# Patient Record
Sex: FEMALE
[De-identification: names, ages, dates, MRNs, and addresses within clinical notes are randomized per-mention and may not be internally consistent; named-entity substitution may affect disease eponyms.]

---

## 2017-03-24 ENCOUNTER — HOSPITAL ENCOUNTER (EMERGENCY)
Dept: HOSPITAL 31 - C.ER | Age: 82
Discharge: HOME | End: 2017-03-24
Payer: COMMERCIAL

## 2017-03-24 VITALS
TEMPERATURE: 97.7 F | HEART RATE: 79 BPM | OXYGEN SATURATION: 97 % | DIASTOLIC BLOOD PRESSURE: 64 MMHG | SYSTOLIC BLOOD PRESSURE: 126 MMHG

## 2017-03-24 VITALS — BODY MASS INDEX: 20.4 KG/M2

## 2017-03-24 VITALS — RESPIRATION RATE: 20 BRPM

## 2017-03-24 DIAGNOSIS — S80.01XA: Primary | ICD-10-CM
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
## 2017-03-24 DIAGNOSIS — R60.0: ICD-10-CM

## 2017-03-24 LAB
ALBUMIN/GLOB SERPL: 1.2 {RATIO} (ref 1–2.1)
ALP SERPL-CCNC: 94 U/L (ref 38–126)
ALT SERPL-CCNC: 32 U/L (ref 9–52)
AST SERPL-CCNC: 43 U/L (ref 14–36)
BACTERIA #/AREA URNS HPF: (no result) /[HPF]
BASOPHILS # BLD AUTO: 0 K/UL (ref 0–0.2)
BASOPHILS NFR BLD: 0.4 % (ref 0–2)
BILIRUB SERPL-MCNC: 1 MG/DL (ref 0.2–1.3)
BILIRUB UR-MCNC: NEGATIVE MG/DL
BUN SERPL-MCNC: 24 MG/DL (ref 7–17)
CALCIUM SERPL-MCNC: 10 MG/DL (ref 8.6–10.4)
CHLORIDE SERPL-SCNC: 97 MMOL/L (ref 98–107)
CO2 SERPL-SCNC: 31 MMOL/L (ref 22–30)
EOSINOPHIL # BLD AUTO: 0.1 K/UL (ref 0–0.7)
EOSINOPHIL NFR BLD: 0.8 % (ref 0–4)
ERYTHROCYTE [DISTWIDTH] IN BLOOD BY AUTOMATED COUNT: 14.5 % (ref 11.5–14.5)
GLOBULIN SER-MCNC: 3.9 GM/DL (ref 2.2–3.9)
GLUCOSE SERPL-MCNC: 98 MG/DL (ref 65–105)
GLUCOSE UR STRIP-MCNC: NORMAL MG/DL
HCT VFR BLD CALC: 42.1 % (ref 34–47)
KETONES UR STRIP-MCNC: NEGATIVE MG/DL
LEUKOCYTE ESTERASE UR-ACNC: (no result) LEU/UL
LYMPHOCYTES # BLD AUTO: 2.2 K/UL (ref 1–4.3)
LYMPHOCYTES NFR BLD AUTO: 24.2 % (ref 20–40)
MCH RBC QN AUTO: 28.7 PG (ref 27–31)
MCHC RBC AUTO-ENTMCNC: 33.9 G/DL (ref 33–37)
MCV RBC AUTO: 84.7 FL (ref 81–99)
MONOCYTES # BLD: 0.7 K/UL (ref 0–0.8)
MONOCYTES NFR BLD: 7.3 % (ref 0–10)
NRBC BLD AUTO-RTO: 0 % (ref 0–2)
PH UR STRIP: 7 [PH] (ref 5–8)
PLATELET # BLD: 299 K/UL (ref 130–400)
PMV BLD AUTO: 8.7 FL (ref 7.2–11.7)
POTASSIUM SERPL-SCNC: 3.5 MMOL/L (ref 3.6–5.2)
PROT SERPL-MCNC: 8.5 G/DL (ref 6.3–8.3)
PROT UR STRIP-MCNC: (no result) MG/DL
RBC # UR STRIP: NEGATIVE /UL
RBC #/AREA URNS HPF: 1 /HPF (ref 0–3)
SODIUM SERPL-SCNC: 143 MMOL/L (ref 132–148)
SP GR UR STRIP: 1.01 (ref 1–1.03)
UROBILINOGEN UR-MCNC: NORMAL MG/DL (ref 0.2–1)
WBC # BLD AUTO: 9.2 K/UL (ref 4.8–10.8)
WBC #/AREA URNS HPF: 12 /HPF (ref 0–5)

## 2017-03-24 PROCEDURE — 85025 COMPLETE CBC W/AUTO DIFF WBC: CPT

## 2017-03-24 PROCEDURE — 85378 FIBRIN DEGRADE SEMIQUANT: CPT

## 2017-03-24 PROCEDURE — 93971 EXTREMITY STUDY: CPT

## 2017-03-24 PROCEDURE — 99284 EMERGENCY DEPT VISIT MOD MDM: CPT

## 2017-03-24 PROCEDURE — 81001 URINALYSIS AUTO W/SCOPE: CPT

## 2017-03-24 PROCEDURE — 84484 ASSAY OF TROPONIN QUANT: CPT

## 2017-03-24 PROCEDURE — 96372 THER/PROPH/DIAG INJ SC/IM: CPT

## 2017-03-24 PROCEDURE — 83880 ASSAY OF NATRIURETIC PEPTIDE: CPT

## 2017-03-24 PROCEDURE — 80053 COMPREHEN METABOLIC PANEL: CPT

## 2017-03-24 NOTE — C.PDOC
History Of Present Illness


85 year old patient, with a past medical history of hypertension, hyperlipidemia

, anemia, and Alzheimer's Disease, presents to the ED after Dr. Quiles's 

referral. Patient states she has a right lower leg hematoma and swelling. Her 

PMD wants to rule out DVT and sent the patient here. Patient denies numbness, 

weakness, fever, or any other complaints at this time. 


Time Seen by Provider: 17 13:42


Chief Complaint (Nursing): Lower Extremity Problem/Injury


History Per: Patient


History/Exam Limitations: no limitations


Onset/Duration Of Symptoms: Other


Severity: Mild


Pain Scale Rating Of: 3


Recent travel outside of the [place]: No





Past Medical History


Reviewed: Historical Data, Nursing Documentation, Vital Signs


Vital Signs: 


 Last Vital Signs











Temp  97.7 F   17 13:09


 


Pulse  79   17 13:09


 


Resp  20   17 17:01


 


BP  126/64   17 13:09


 


Pulse Ox  97   17 18:36














- Medical History


PMH: Alzheimer's Disease, Anemia, HTN, Hyperlipidemia


Family History: States: Unknown Family Hx





- Social History


Hx Alcohol Use: No


Hx Substance Use: No





- Immunization History


Hx Tetanus Toxoid Vaccination: No


Hx Influenza Vaccination: Yes


Hx Pneumococcal Vaccination: No





Review Of Systems


Except As Marked, All Systems Reviewed And Found Negative.


Constitutional: Negative for: Fever


Cardiovascular: Positive for: Other (right lower leg swelling and hematoma)


Musculoskeletal: Positive for: Leg Pain (right lower)


Neurological: Negative for: Weakness, Numbness





Physical Exam





- Physical Exam


Appears: Non-toxic, No Acute Distress


Skin: Warm, Dry, Other (2 out of 4 edema right knee down to foot; 5 x 5 cm 

hematoma behind the right knee to the posterior medial aspect of the right knee)


Head: Atraumatic, Normacephalic


Neck: Normal ROM, Supple


Chest: Symmetrical


Cardiovascular: Rhythm Regular


Respiratory: Normal Breath Sounds, No Rales, No Rhonchi, No Wheezing


Gastrointestinal/Abdominal: Soft, No Tenderness


Back: Normal Inspection, No CVA Tenderness


Extremity: Normal ROM, No Calf Tenderness, No Deformity


Neurological/Psych: Oriented x3





ED Course And Treatment





- Laboratory Results


Result Diagrams: 


 17 14:45





 17 14:45


Lab Interpretation: Abnormal (d-dimer 352 H)


ECG: Interpreted By Me


ECG Rhythm: Sinus Rhythm


ECG Interpretation: Normal


Rate From EC


O2 Sat by Pulse Oximetry: 97 (RA)


Pulse Ox Interpretation: Normal


Progress Note: EKG, Labs, Lovenox, VBG


Reevaluation Time: 16:46


Reassessment Condition: Unchanged





- Physician Consult Information


Outcome Of Conversation: 1640: d/w Dr. Quiles- ok to d/c and f/u in office on 

Wenesday 3/29. no acute interventions.





Medical Decision Making


Medical Decision Making: 


R leg hematoma posteriormedial R  knee area, and NO DVT by US





Disposition


Doctor Will See Patient In The: Office


Counseled Patient/Family Regarding: Studies Performed, Diagnosis





- Disposition


Referrals: 


Cheyenne Quiles MD [Medical Doctor] - 


Disposition: HOME/ ROUTINE


Disposition Time: 16:47


Condition: GOOD


Additional Instructions: 


mantiene elevado tima puede


Sigue con Dr. Quiles en lindo officina el [place] - llama para hacer tyron.


Instructions:  Leg Edema (ED), Hematoma (ED)


Print Language: Sammarinese





- Clinical Impression


Clinical Impression: 


 Leg edema, right, Hematoma





- Scribe Statement


The provider has reviewed the documentation as recorded by the Scribe


Delmis Webster


Provider Attestation: 





All medical record entries made by the Scribe were at my direction and 

personally dictated by me. I have reviewed the chart and agree that the record 

accurately reflects my personal performance of the history, physical exam, 

medical decision making, and the department course for this patient. I have 

also personally directed, reviewed, and agree with the discharge instructions 

and disposition.

## 2017-03-27 NOTE — VASCLAB
PROCEDURE:  Right Lower Extremity Venous Duplex Exam. 



HISTORY:

R leg edema x 2 wks 



PRIORS:

07/18/2016 



TECHNIQUE:

Right common femoral, femoral, popliteal and posterior tibial, 

peroneal and great saphenous veins were evaluated. Flow was assessed 

with color Doppler, compressibility, assessment of phasic flow and 

augmentation response.



Report prepared by   TALON Flores, RVT



FINDINGS:



RIGHT:

1. Common Femoral Vein: 



1.1. Compressibility - Fully compressible: Thrombus -  None: Flow - 

Phasic: Augmentation -Normal: Reflux - None.



2. Femoral Vein: 



2.1. Compressibility - Fully compressible: Thrombus -  None: Flow - 

Phasic: Augmentation -Normal: Reflux - None.



3. Popliteal Vein: 



3.1. Compressibility - Fully compressible: Thrombus -  None: Flow - 

Phasic: Augmentation -Normal: Reflux - None.



4. Posterior Tibial Vein: 



4.1. Compressibility - Fully compressible: Thrombus -  None: Flow - 

Phasic: Augmentation -Normal: Reflux - None.



5. Peroneal Vein: 



5.1. Compressibility - Fully compressible: Thrombus - None:  Flow - 

Phasic: Augmentation -Normal: Reflux - None.



6. Great Saphenous Vein:

6.1.  Compressibility - Fully compressible: Thrombus -None: Flow - 

Phasic: Augmentation - Normal: Reflux - None.





OTHER FINDINGS:  Right lower extremity edema.



IMPRESSION:

No evidence of deep or superficial vein thrombosis of the right lower 

extremity with excellent venous flow. Normal valve function noted of 

the right side.     



Normal venous flow noted in the left common femoral vein.

## 2019-02-22 ENCOUNTER — HOSPITAL ENCOUNTER (INPATIENT)
Dept: HOSPITAL 31 - C.ER | Age: 84
LOS: 4 days | Discharge: NURSING, REHAB OR LONG-TERM CARE | DRG: 66 | End: 2019-02-26
Attending: INTERNAL MEDICINE | Admitting: INTERNAL MEDICINE
Payer: COMMERCIAL

## 2019-02-22 VITALS — BODY MASS INDEX: 20.4 KG/M2

## 2019-02-22 DIAGNOSIS — Z86.73: ICD-10-CM

## 2019-02-22 DIAGNOSIS — E11.9: ICD-10-CM

## 2019-02-22 DIAGNOSIS — G30.9: ICD-10-CM

## 2019-02-22 DIAGNOSIS — D64.9: ICD-10-CM

## 2019-02-22 DIAGNOSIS — F02.80: ICD-10-CM

## 2019-02-22 DIAGNOSIS — R29.810: ICD-10-CM

## 2019-02-22 DIAGNOSIS — R47.01: ICD-10-CM

## 2019-02-22 DIAGNOSIS — I63.89: Primary | ICD-10-CM

## 2019-02-22 DIAGNOSIS — E78.5: ICD-10-CM

## 2019-02-22 DIAGNOSIS — I10: ICD-10-CM

## 2019-02-22 LAB
ALBUMIN SERPL-MCNC: 4.8 G/DL (ref 3.5–5)
ALBUMIN/GLOB SERPL: 1.5 {RATIO} (ref 1–2.1)
ALT SERPL-CCNC: 19 U/L (ref 9–52)
APTT BLD: 30 SECONDS (ref 21–34)
AST SERPL-CCNC: 34 U/L (ref 14–36)
BASOPHILS # BLD AUTO: 0.1 K/UL (ref 0–0.2)
BASOPHILS NFR BLD: 0.8 % (ref 0–2)
BUN SERPL-MCNC: 21 MG/DL (ref 7–17)
CALCIUM SERPL-MCNC: 9.8 MG/DL (ref 8.6–10.4)
EOSINOPHIL # BLD AUTO: 0 K/UL (ref 0–0.7)
EOSINOPHIL NFR BLD: 0.4 % (ref 0–4)
ERYTHROCYTE [DISTWIDTH] IN BLOOD BY AUTOMATED COUNT: 14.9 % (ref 11.5–14.5)
GFR NON-AFRICAN AMERICAN: > 60
HDLC SERPL-MCNC: 70 MG/DL (ref 30–70)
HGB BLD-MCNC: 13.4 G/DL (ref 11–16)
INR PPP: 1
LDLC SERPL-MCNC: 68 MG/DL (ref 0–129)
LYMPHOCYTES # BLD AUTO: 1.6 K/UL (ref 1–4.3)
LYMPHOCYTES NFR BLD AUTO: 17.3 % (ref 20–40)
MCH RBC QN AUTO: 28.6 PG (ref 27–31)
MCHC RBC AUTO-ENTMCNC: 33.7 G/DL (ref 33–37)
MCV RBC AUTO: 84.7 FL (ref 81–99)
MONOCYTES # BLD: 0.5 K/UL (ref 0–0.8)
MONOCYTES NFR BLD: 5.4 % (ref 0–10)
NEUTROPHILS # BLD: 6.8 K/UL (ref 1.8–7)
NEUTROPHILS NFR BLD AUTO: 76.1 % (ref 50–75)
NRBC BLD AUTO-RTO: 0.1 % (ref 0–2)
PLATELET # BLD: 278 K/UL (ref 130–400)
PMV BLD AUTO: 10.3 FL (ref 7.2–11.7)
PROTHROMBIN TIME: 10.9 SECONDS (ref 9.7–12.2)
RBC # BLD AUTO: 4.7 MIL/UL (ref 3.8–5.2)
WBC # BLD AUTO: 8.9 K/UL (ref 4.8–10.8)

## 2019-02-22 NOTE — C.PDOC
History Of Present Illness


87 year old female brought in from home by EMS for change in mental status. 

Patient usually has a home health aid, who noticed she was shaking, subjective 

fever, and unresponsive. At baseline patient is usually able to walk , talk ,and

cook for herself. She was last seen behaving normally 4 days ago. 





Time Seen by Provider: 02/22/19 17:43


Chief Complaint (Nursing): Altered Mental Status


History Per: EMS, Other (home health aid)


Onset/Duration Of Symptoms: Hrs


Current Symptoms Are (Timing): Still Present


Usual Baseline: Ambulatory, Other (Verbal )


Associated Symptoms: Fever, Other (tremors)





Past Medical History


Reviewed: Historical Data, Nursing Documentation, Vital Signs


Vital Signs: 





                                Last Vital Signs











Temp  96.6 F L  02/22/19 17:33


 


Pulse  93 H  02/22/19 18:51


 


Resp  20   02/22/19 18:51


 


BP  157/65 H  02/22/19 18:51


 


Pulse Ox  99   02/22/19 18:51














- Medical History


PMH: Alzheimer's Disease, Anemia, HTN, Hyperlipidemia


   Denies: Chronic Kidney Disease


Surgical History: No Surg Hx


Family History: States: Unknown Family Hx





- Social History


Hx Alcohol Use: No


Hx Substance Use: No





- Immunization History


Hx Tetanus Toxoid Vaccination: No


Hx Influenza Vaccination: Yes


Hx Pneumococcal Vaccination: No





Review Of Systems


Review Of Systems: ROS cannot be obtained secondary to pt's inabilty to answer 

questions. (patient unresponsive)





Physical Exam





- Physical Exam


Appears: Other (Awake, responsive only to painful stimuli)


Skin: Normal Color, Warm, Dry


Head: Atraumatic, Normacephalic


Neck: Normal ROM, Supple


Chest: Symmetrical, No Deformity


Cardiovascular: Rhythm Regular, No Murmur


Respiratory: No Accessory Muscle Use


Gastrointestinal/Abdominal: Soft, No Tenderness


Extremity: Capillary Refill (<2 seconds)


Extremity: Bilateral: Atraumatic, Normal Color And Temperature


Pulses: Left Radial: Normal, Right Radial: Normal


Neurological/Psych: Other (responsive only to painful stimuli)





ED Course And Treatment





- Laboratory Results


Result Diagrams: 


                                 02/22/19 18:04





                                 02/22/19 18:04


Lab Results: 





                                        











PT  10.9 SECONDS (9.7-12.2)   02/22/19  18:04    


 


INR  1.0   02/22/19  18:04    


 


APTT  30 SECONDS (21-34)   02/22/19  18:04    








                                        











Troponin I  < 0.0120 ng/mL (0.00-0.120)   02/22/19  18:04    








                                        











Total Bilirubin  1.2 mg/dL (0.2-1.3)   02/22/19  18:04    


 


AST  34 U/L (14-36)   02/22/19  18:04    


 


ALT  19 U/L (9-52)   02/22/19  18:04    


 


Alkaline Phosphatase  146 U/L ()  H  02/22/19  18:04    


 


Total Protein  8.1 g/dL (6.3-8.3)   02/22/19  18:04    


 


Albumin  4.8 g/dL (3.5-5.0)   02/22/19  18:04    


 


Globulin  3.2 gm/dL (2.2-3.9)   02/22/19  18:04    


 


Albumin/Globulin Ratio  1.5  (1.0-2.1)   02/22/19  18:04    











O2 Sat by Pulse Oximetry: 99 (RA)


Pulse Ox Interpretation: Normal





- Radiology


CXR: Interpreted by Me


CXR Interpretation: Yes: No Acute Disease





- CT Scan/US


  ** Head CT


Other Rad Studies (CT/US): Interpreted By Me, Read By Radiologist


CT/US Interpretation: atient Name / ID : AUGUSTO CHRISTINE M  / [id number].  Exam Date

: 02/22/2019 18:15:23 ( Approved ).  Study Comment :  Sex / Age : F  / 87Y.  

Creator : Ousmane Rodriguez MD.  Dictator : Ousmane Rodriguez MD.  

:  Approver : Ousmane Rodriguez MD.  Approver2 :  Report Date : 02/22/2019 

18:32:37.  My Comment :  ****************

*******************************************************************.  Date of 

service:  02/22/2019.  PROCEDURE:  CT HEAD WITHOUT CONTRAST.  HISTORY:  Mental 

status change.  COMPARISON:  No prior study available comparison.  TECHNIQUE:  

Axial computed tomography images were obtained through the head/brain without 

intravenous contrast.  Radiation dose:  Total exam DLP = 0.0 mGy-cm.  This CT 

exam was performed using one or more of the following dose reduction techniques:

Automated exposure control, adjustment of the mA and/or kV according to patient 

size, and/or use of iterative reconstruction technique.  FINDINGS:  HEMORRHAGE: 

No acute parenchymal, subarachnoid or extra-axial hemorrhage.  BRAIN:  There are

discrete chronic appearing left frontoparietal infarct changes with overlying ex

vacuo enlargement of the cerebral sulci. There is also on ex vacuo dilatation of

the left lateral ventricle... In addition, moderate diffuse and confluent 

chronic white matter ischemic changes seen extending peripherally into the deep 

and subcortical white matter both cerebral hemispheres. Few scattered chronic 

bilateral basal nuclei lacunar type infarcts also felt present.. Note that the 

possibility of a small hyperacute infarct not excluded.  Moderate to significant

atrophy slightly more central evidenced by disproportionate enlargement of the 

ventricles compared to sulci.  Vascular calcifications both carotid siphons.  No

obvious parenchymal nor extra-axial mass or collection seen on this noncontrast 

exam.  VENTRICLES:  The ventricles are dilated felt to be on ex vacuo basis 

particularly the left lateral ventricle more so than right and do not appear to 

be under tension.  CALVARIUM:  Unremarkable.  PARANASAL SINUSES:  Unremarkable 

as visualized. No significant inflammatory changes.  MASTOID AIR CELLS:  Note 

that in situ hearing aid partially obscures the temporal bones including the m

astoid air complexes. There is partial opacification of the right mastoid air 

complex and subtotal opacification left mastoid air complex. There is also 

partial opacification left middle ear canal..  OTHER FINDINGS:  The cataract 

surgery present.  IMPRESSION:  There are discrete chronic appearing left 

frontoparietal infarct changes with overlying ex vacuo enlargement of the 

cerebral sulci. There is also on ex vacuo dilatation of the left lateral 

ventricle... In addition, moderate diffuse and confluent chronic white matter 

ischemic changes seen extending peripherally into the deep and subcortical white

matter both cerebral hemispheres. Few scattered chronic bilateral basal nuclei 

lacunar type infarcts also felt present.. Note that the possibility of a small 

hyperacute infarct not excluded.  Moderate to significant atrophy slightly more 

central evidenced by disproportionate enlargement of the ventricles compared to 

sulci.


Progress Note: Head CT, EKG , and CXR ordered for patient. Labs and CBC ordered 

for patient.  Patient given Aspirin TX and IV fluids.  1900: spoke with Dr Bowen who requested consult with Dr. Fontenot.  1910: Dr. Fontenot agrees with course 

treatment. Recommends repeat CT scan in the morning. Recommmends it be ordered 

by another physician.  1900: pt re-eval. easily arousable, awake. persistent 

complete receptive and expressive aphasia c/w L temporal lobe stroke.  Gestures 

in coordinated fashion in response to verbal cues.


Reevaluation Time: 19:00


Reassessment Condition: Improved





Medical Decision Making


Medical Decision Making: 


Impression: left frontal parietal sub-acute stroke. resultant receptive and 

expressive aphasia, suggesting insult to Broca's and Wernicke's areas of the L 

temporal lobe, last seen baseline walking/talking/cooking approx 4 days ago.








Disposition


Doctor Will See Patient In The: Hospital


Counseled Patient/Family Regarding: Studies Performed, Diagnosis





- Disposition


Disposition: HOSPITALIZED


Disposition Time: 19:10


Condition: FAIR





- Clinical Impression


Clinical Impression: 


 Combined receptive and expressive aphasia, CVA (cerebral vascular accident)








- Scribe Statement


The provider has reviewed the documentation as recorded by the Scribe (Lamar Ordaz)








All medical record entries made by the Scribe were at my direction and 

personally dictated by me. I have reviewed the chart and agree that the record 

accurately reflects my personal performance of the history, physical exam, me

dical decision making, and the department course for this patient. I have also 

personally directed, reviewed, and agree with the discharge instructions and 

disposition.

## 2019-02-22 NOTE — C.PDOC
History Of Present Illness





biba from home, d


Time Seen by Provider: 02/22/19 17:43


Chief Complaint (Nursing): Altered Mental Status





Past Medical History


Vital Signs: 





                                Last Vital Signs











Temp  96.6 F L  02/22/19 17:33


 


Pulse  93 H  02/22/19 18:51


 


Resp  20   02/22/19 18:51


 


BP  157/65 H  02/22/19 18:51


 


Pulse Ox  99   02/22/19 18:51














- Medical History


PMH: Alzheimer's Disease, Anemia, HTN, Hyperlipidemia


   Denies: Chronic Kidney Disease


Family History: States: Unknown Family Hx





- Social History


Hx Alcohol Use: No


Hx Substance Use: No





- Immunization History


Hx Tetanus Toxoid Vaccination: No


Hx Influenza Vaccination: Yes


Hx Pneumococcal Vaccination: No





ED Course And Treatment





- Laboratory Results


Result Diagrams: 


                                 02/22/19 18:04





                                 02/22/19 18:04


Lab Results: 





                                        











PT  10.9 SECONDS (9.7-12.2)   02/22/19  18:04    


 


INR  1.0   02/22/19  18:04    


 


APTT  30 SECONDS (21-34)   02/22/19  18:04    








                                        











Troponin I  < 0.0120 ng/mL (0.00-0.120)   02/22/19  18:04    








                                        











Total Bilirubin  1.2 mg/dL (0.2-1.3)   02/22/19  18:04    


 


AST  34 U/L (14-36)   02/22/19  18:04    


 


ALT  19 U/L (9-52)   02/22/19  18:04    


 


Alkaline Phosphatase  146 U/L ()  H  02/22/19  18:04    


 


Total Protein  8.1 g/dL (6.3-8.3)   02/22/19  18:04    


 


Albumin  4.8 g/dL (3.5-5.0)   02/22/19  18:04    


 


Globulin  3.2 gm/dL (2.2-3.9)   02/22/19  18:04    


 


Albumin/Globulin Ratio  1.5  (1.0-2.1)   02/22/19  18:04    











O2 Sat by Pulse Oximetry: 99





NIHSS Stroke Scale 2





- Date/Time Evaluation Performed


Date Performed: 02/22/19


Time Performed: 17:45


When Was NIHSS Performed: Baseline





- How Severe is the Stroke


Level of Consciousness: 2=Obtunded


LOC to Questions: 2=Neither correct


LOC to commands: 0=Obeys both correctly


Best Gaze: 0=Normal


Visual: 0=No visual loss


Facial: 0=Normal


Motor Arm - Left: 0=No drift


Motor Arm - Right: 0=No drift


Motor Leg - Left: 0=No drift


Motor Leg - Right: 0=No drift


Limb Ataxia: 0=Absent


Sensory: 0=Normal


Best Language: 3=Mute


Dysarthia: 2=Severe, near unintelligible or worse


Extinction & Inattention (Neglect): 0=Normal, no object


Score: 9





rTPA Inclusion/Exclusion





- Refusal of Treatment


Patient Refused Treatment: No





- Inclusion Criteria for Altepase


Patient is 18 years or Older: Yes


Clinical DX Ischemic Stroke Cause Neurological Deficit: Yes


Time of Onset Established Less Than 270 Mins Before TX Begin: No


Risk/Benefit Discussed With Patient/Family Member Present: No





- Warning to TPA With Conditions


Condition: Age Greater Than 75 years





Disposition





- Disposition


Disposition: HOSPITALIZED

## 2019-02-23 LAB
BUN SERPL-MCNC: 13 MG/DL (ref 7–17)
CALCIUM SERPL-MCNC: 9.3 MG/DL (ref 8.6–10.4)
GFR NON-AFRICAN AMERICAN: > 60

## 2019-02-23 RX ADMIN — INSULIN ASPART SCH: 100 INJECTION, SOLUTION INTRAVENOUS; SUBCUTANEOUS at 21:38

## 2019-02-23 RX ADMIN — INSULIN ASPART SCH: 100 INJECTION, SOLUTION INTRAVENOUS; SUBCUTANEOUS at 11:48

## 2019-02-23 RX ADMIN — INSULIN ASPART SCH: 100 INJECTION, SOLUTION INTRAVENOUS; SUBCUTANEOUS at 08:00

## 2019-02-23 RX ADMIN — INSULIN ASPART SCH: 100 INJECTION, SOLUTION INTRAVENOUS; SUBCUTANEOUS at 17:23

## 2019-02-23 NOTE — RAD
Date of service: 



02/22/2019



HISTORY:

 Code Stroke 



COMPARISON:

10/19/2016 



FINDINGS:



LUNGS:

No active pulmonary disease.



PLEURA:

No significant pleural effusion identified, no pneumothorax apparent.



CARDIOVASCULAR:

No aortic atherosclerotic calcification present.



Normal cardiac size. No pulmonary vascular congestion. 



OSSEOUS STRUCTURES:

No significant abnormalities.



VISUALIZED UPPER ABDOMEN:

Normal.



OTHER FINDINGS:

None.



IMPRESSION:

No active disease.

## 2019-02-23 NOTE — CP.PCM.HP
History of Present Illness





- History of Present Illness


History of Present Illness: 





87 year old female brought in from home by EMS for change in mental status. 

Patient usually has a home health aid, who noticed she was shaking, subjective 

fever, and unresponsive. At baseline patient is usually able to walk , talk ,and

cook for herself. She was last seen behaving normally 4 days ago. 





Present on Admission





- Present on Admission


Any Indicators Present on Admission: No





Review of Systems





- Review of Systems


All systems: reviewed and no additional remarkable complaints except (As menti

oned in HPI)





Past Patient History





- Infectious Disease


Hx of Infectious Diseases: None





- Past Medical History & Family History


Past Medical History?: Yes





- Past Social History


Smoking Status: Never Smoked





- CARDIAC


Hx Hypertension: Yes





- PULMONARY


Hx Respiratory Disorders: No





- NEUROLOGICAL


Hx Alzheimer's Disease: Yes





- HEENT


Hx HEENT Problems: No





- RENAL


Hx Chronic Kidney Disease: No





- ENDOCRINE/METABOLIC


Hx Endocrine Disorders: Yes


Hx Diabetes Mellitus Type 2: Yes





- HEMATOLOGICAL/ONCOLOGICAL


Hx Anemia: Yes





- INTEGUMENTARY


Hx Dermatological Problems: No





- MUSCULOSKELETAL/RHEUMATOLOGICAL


Hx Musculoskeletal Disorders: No


Hx Falls: No





- GASTROINTESTINAL


Hx Gastrointestinal Disorders: No





- GENITOURINARY/GYNECOLOGICAL


Hx Genitourinary Disorders: No





- PSYCHIATRIC


Hx Substance Use: No





- SURGICAL HISTORY


Hx Surgeries: Yes


Hx Mastectomy: Yes (right breast)





- ANESTHESIA


Hx Anesthesia: Yes


Hx Anesthesia Reactions: No


Hx Malignant Hyperthermia: No





Meds


Home Medications: 


                              Home Medication List











 Medication  Instructions  Recorded  Confirmed  Type


 


Insulin Aspart, Recombinant 0 unit SC ACHS  unit 02/26/19  Rx





[Novolog]    


 


Rosuvastatin Calcium [Crestor] 5 mg PO HS  tab 02/26/19  Rx


 


amLODIPine [Norvasc] 10 mg PO DAILY  tab 02/26/19  Rx











Allergies/Adverse Reactions: 


                                    Allergies











Allergy/AdvReac Type Severity Reaction Status Date / Time


 


No Known Allergies Allergy   Verified 02/22/19 17:38














Physical Exam





- Head Exam


Head Exam: NORMAL INSPECTION





- Eye Exam


Eye Exam: Normal appearance





- ENT Exam


ENT Exam: Mucous Membranes Moist





- Respiratory Exam


Respiratory Exam: Clear to Auscultation Bilateral





- Cardiovascular Exam


Cardiovascular Exam: REGULAR RHYTHM, +S1, +S2





- GI/Abdominal Exam


GI & Abdominal Exam: Normal Bowel Sounds





- Extremities Exam


Extremities exam: Positive for: normal inspection





Results





- Vital Signs


Recent Vital Signs: 





                                Last Vital Signs











Temp  98.5 F   02/23/19 07:00


 


Pulse  57 L  02/23/19 07:30


 


Resp  20   02/23/19 07:00


 


BP  131/51 L  02/23/19 07:00


 


Pulse Ox  96   02/23/19 07:00














- Labs


Result Diagrams: 


                                 02/22/19 18:04





                                 02/24/19 08:06


Labs: 





                         Laboratory Results - last 24 hr











  02/22/19 02/22/19 02/22/19





  17:31 18:04 18:04


 


WBC   8.9 


 


RBC   4.70 


 


Hgb   13.4 


 


Hct   39.8 


 


MCV   84.7 


 


MCH   28.6 


 


MCHC   33.7 


 


RDW   14.9 H 


 


Plt Count   278 


 


MPV   10.3 


 


Neut % (Auto)   76.1 H 


 


Lymph % (Auto)   17.3 L 


 


Mono % (Auto)   5.4 


 


Eos % (Auto)   0.4 


 


Baso % (Auto)   0.8 


 


Neut # (Auto)   6.8 


 


Lymph # (Auto)   1.6 


 


Mono # (Auto)   0.5 


 


Eos # (Auto)   0.0 


 


Baso # (Auto)   0.1 


 


PT    10.9


 


INR    1.0


 


APTT    30


 


Sodium   


 


Potassium   


 


Chloride   


 


Carbon Dioxide   


 


Anion Gap   


 


BUN   


 


Creatinine   


 


Est GFR ( Amer)   


 


Est GFR (Non-Af Amer)   


 


POC Glucose (mg/dL)  156 H  


 


Random Glucose   


 


Hemoglobin A1c   


 


Calcium   


 


Total Bilirubin   


 


AST   


 


ALT   


 


Alkaline Phosphatase   


 


Troponin I   


 


Total Protein   


 


Albumin   


 


Globulin   


 


Albumin/Globulin Ratio   


 


Triglycerides   


 


Cholesterol   


 


LDL Cholesterol Direct   


 


HDL Cholesterol   














  02/22/19 02/22/19 02/22/19





  18:04 18:04 20:57


 


WBC   


 


RBC   


 


Hgb   


 


Hct   


 


MCV   


 


MCH   


 


MCHC   


 


RDW   


 


Plt Count   


 


MPV   


 


Neut % (Auto)   


 


Lymph % (Auto)   


 


Mono % (Auto)   


 


Eos % (Auto)   


 


Baso % (Auto)   


 


Neut # (Auto)   


 


Lymph # (Auto)   


 


Mono # (Auto)   


 


Eos # (Auto)   


 


Baso # (Auto)   


 


PT   


 


INR   


 


APTT   


 


Sodium  142  


 


Potassium  3.2 L  


 


Chloride  104  


 


Carbon Dioxide  25  


 


Anion Gap  17  


 


BUN  21 H  


 


Creatinine  0.7  


 


Est GFR ( Amer)  > 60  


 


Est GFR (Non-Af Amer)  > 60  


 


POC Glucose (mg/dL)    118 H


 


Random Glucose  154 H D  


 


Hemoglobin A1c   5.8 


 


Calcium  9.8  


 


Total Bilirubin  1.2  


 


AST  34  


 


ALT  19  


 


Alkaline Phosphatase  146 H  


 


Troponin I  < 0.0120  


 


Total Protein  8.1  


 


Albumin  4.8  


 


Globulin  3.2  


 


Albumin/Globulin Ratio  1.5  


 


Triglycerides  104  


 


Cholesterol  144  


 


LDL Cholesterol Direct  68  


 


HDL Cholesterol  70  














  02/23/19 02/23/19





  06:24 11:20


 


WBC  


 


RBC  


 


Hgb  


 


Hct  


 


MCV  


 


MCH  


 


MCHC  


 


RDW  


 


Plt Count  


 


MPV  


 


Neut % (Auto)  


 


Lymph % (Auto)  


 


Mono % (Auto)  


 


Eos % (Auto)  


 


Baso % (Auto)  


 


Neut # (Auto)  


 


Lymph # (Auto)  


 


Mono # (Auto)  


 


Eos # (Auto)  


 


Baso # (Auto)  


 


PT  


 


INR  


 


APTT  


 


Sodium   144


 


Potassium   3.0 L


 


Chloride   110 H


 


Carbon Dioxide   28


 


Anion Gap   9 L


 


BUN   13


 


Creatinine   0.6 L


 


Est GFR ( Amer)   > 60


 


Est GFR (Non-Af Amer)   > 60


 


POC Glucose (mg/dL)  88 


 


Random Glucose   92  D


 


Hemoglobin A1c  


 


Calcium   9.3


 


Total Bilirubin  


 


AST  


 


ALT  


 


Alkaline Phosphatase  


 


Troponin I  


 


Total Protein  


 


Albumin  


 


Globulin  


 


Albumin/Globulin Ratio  


 


Triglycerides  


 


Cholesterol  


 


LDL Cholesterol Direct  


 


HDL Cholesterol  














Assessment & Plan


(1) CVA (cerebral vascular accident)


Status: Acute   





(2) HTN (hypertension)


Status: Acute   





(3) Diabetes


Status: Acute   





- Assessment and Plan (Free Text)


Plan: 





Continue Norvasc


Ecotrin 81 mg


Plavix 75 mg daily


Hydrochlorothiazide


Accu-Chek


Insulin coverage


Losartan


Neurology consult


MRI brain


DVT/GI prophylaxis

## 2019-02-23 NOTE — MRI
Date of service: 



02/23/2019



PROCEDURE:  MRI BRAIN WITHOUT CONTRAST



HISTORY:

stroke



COMPARISON:

CT head without contrast from 02/22/2019. 



TECHNIQUE:

Multiplanar, multisequence MR images of the brain were obtained 

without intravenous contrast enhancement.



FINDINGS:



HEMORRHAGE:

None



DWI:

No evidence of an acute or early subacute infarction.



BRAIN PARENCHYMA:

There is cystic encephalomalacia and gliosis in the left 

parieto-occipital lobe.  There are old lacunar infarctions in the 

left frontal and parietal subcortical white matter, and corona 

radiata. 



There are moderate chronic microangiopathic changes.  There is no 

mass, mass effect or abnormal extra-axial fluid collection. 



VENTRICLES:

There is severe age-related global parenchymal volume loss and 

proportionate enlargement of the ventricles and cortical sulci. 



CRANIUM:

There is normal bone marrow signal pattern.



ORBITS:

Grossly unremarkable.



PARANASAL SINUSES/MASTOIDS:

The paranasal sinuses are predominantly clear.  There are bilateral 

mastoid effusions. 



VASCULAR SYSTEM:

There are normal signal voids in the larger intracranial arteries. 



OTHER FINDINGS:

None. 



IMPRESSION:

No acute intracranial abnormality.



Left parieto-occipital cystic encephalomalacia and gliosis, sequela 

of remote MCA PCA watershed territory infarction.



Old lacunar infarctions in the left frontal and parietal subcortical 

white matter, and corona radiata.



Moderate chronic microangiopathic changes and severe age-related 

global parenchymal volume loss.



Bilateral mastoid effusions.

## 2019-02-23 NOTE — CP.PCM.CON
History of Present Illness





- History of Present Illness


History of Present Illness: 





Neurology consult dictated. 


Chart reviewed and plans noted. 


Patient with possible new left frontal stroke, ischemic in nature, differential 

of recrudescence of old stroke. 


Plan: 


1. Please hold bp meds. If stroke, we will need to keep bp around 170/90. 


2. MRI Brain as ap


3. Continue aspirin and plavix. 


4. PT ST OT





Thank you


OUr team will follow. 





Past Patient History





- Infectious Disease


Hx of Infectious Diseases: None





- Past Medical History & Family History


Past Medical History?: Yes





- Past Social History


Smoking Status: Never Smoked





- CARDIAC


Hx Hypertension: Yes





- PULMONARY


Hx Respiratory Disorders: No





- NEUROLOGICAL


Hx Alzheimer's Disease: Yes





- HEENT


Hx HEENT Problems: No





- RENAL


Hx Chronic Kidney Disease: No





- ENDOCRINE/METABOLIC


Hx Endocrine Disorders: Yes


Hx Diabetes Mellitus Type 2: Yes





- HEMATOLOGICAL/ONCOLOGICAL


Hx Anemia: Yes





- INTEGUMENTARY


Hx Dermatological Problems: No





- MUSCULOSKELETAL/RHEUMATOLOGICAL


Hx Musculoskeletal Disorders: No


Hx Falls: No





- GASTROINTESTINAL


Hx Gastrointestinal Disorders: No





- GENITOURINARY/GYNECOLOGICAL


Hx Genitourinary Disorders: No





- PSYCHIATRIC


Hx Substance Use: No





- SURGICAL HISTORY


Hx Surgeries: Yes


Hx Mastectomy: Yes (right breast)





- ANESTHESIA


Hx Anesthesia: Yes


Hx Anesthesia Reactions: No


Hx Malignant Hyperthermia: No





Meds


Allergies/Adverse Reactions: 


                                    Allergies











Allergy/AdvReac Type Severity Reaction Status Date / Time


 


No Known Allergies Allergy   Verified 02/22/19 17:38














- Medications


Medications: 


                               Current Medications





Amlodipine Besylate (Norvasc)  10 mg PO DAILY [facility]


   Last Admin: 02/23/19 10:47 Dose:  Not Given


Aspirin (Ecotrin)  81 mg PO DAILY [facility]


   Last Admin: 02/23/19 10:47 Dose:  Not Given


Clopidogrel Bisulfate (Plavix)  75 mg PO DAILY [facility]


   Last Admin: 02/23/19 10:47 Dose:  Not Given


Hydrochlorothiazide (Hydrodiuril)  25 mg PO DAILY [facility]


   Last Admin: 02/23/19 10:47 Dose:  Not Given


Sodium Chloride (Sodium Chloride 0.9%)  1,000 mls @ 100 mls/hr IV .Q10H [facility]


   Last Admin: 02/23/19 03:00 Dose:  100 mls/hr


Insulin Aspart (Novolog)  0 unit SC [facility]; Protocol


   Last Admin: 02/23/19 11:48 Dose:  Not Given


Losartan Potassium (Cozaar)  100 mg PO DAILY [facility]


   Last Admin: 02/23/19 10:47 Dose:  Not Given


Pneumococcal Polyvalent Vaccine (Pneumovax 23 Vaccine)  0.5 ml IM .ONCE ONE


   Stop: 02/25/19 10:01


Rosuvastatin Calcium (Crestor)  5 mg PO [facility]











Results





- Vital Signs


Recent Vital Signs: 


                                Last Vital Signs











Temp  98.5 F   02/23/19 07:00


 


Pulse  57 L  02/23/19 07:30


 


Resp  20   02/23/19 07:00


 


BP  131/51 L  02/23/19 07:00


 


Pulse Ox  96   02/23/19 07:00














- Labs


Result Diagrams: 


                                 02/22/19 18:04





                                 02/23/19 11:20


Labs: 


                         Laboratory Results - last 24 hr











  02/22/19 02/22/19 02/22/19





  17:31 18:04 18:04


 


WBC   8.9 


 


RBC   4.70 


 


Hgb   13.4 


 


Hct   39.8 


 


MCV   84.7 


 


MCH   28.6 


 


MCHC   33.7 


 


RDW   14.9 H 


 


Plt Count   278 


 


MPV   10.3 


 


Neut % (Auto)   76.1 H 


 


Lymph % (Auto)   17.3 L 


 


Mono % (Auto)   5.4 


 


Eos % (Auto)   0.4 


 


Baso % (Auto)   0.8 


 


Neut # (Auto)   6.8 


 


Lymph # (Auto)   1.6 


 


Mono # (Auto)   0.5 


 


Eos # (Auto)   0.0 


 


Baso # (Auto)   0.1 


 


PT    10.9


 


INR    1.0


 


APTT    30


 


Sodium   


 


Potassium   


 


Chloride   


 


Carbon Dioxide   


 


Anion Gap   


 


BUN   


 


Creatinine   


 


Est GFR ( Amer)   


 


Est GFR (Non-Af Amer)   


 


POC Glucose (mg/dL)  156 H  


 


Random Glucose   


 


Hemoglobin A1c   


 


Calcium   


 


Total Bilirubin   


 


AST   


 


ALT   


 


Alkaline Phosphatase   


 


Troponin I   


 


Total Protein   


 


Albumin   


 


Globulin   


 


Albumin/Globulin Ratio   


 


Triglycerides   


 


Cholesterol   


 


LDL Cholesterol Direct   


 


HDL Cholesterol   














  02/22/19 02/22/19 02/22/19





  18:04 18:04 20:57


 


WBC   


 


RBC   


 


Hgb   


 


Hct   


 


MCV   


 


MCH   


 


MCHC   


 


RDW   


 


Plt Count   


 


MPV   


 


Neut % (Auto)   


 


Lymph % (Auto)   


 


Mono % (Auto)   


 


Eos % (Auto)   


 


Baso % (Auto)   


 


Neut # (Auto)   


 


Lymph # (Auto)   


 


Mono # (Auto)   


 


Eos # (Auto)   


 


Baso # (Auto)   


 


PT   


 


INR   


 


APTT   


 


Sodium  142  


 


Potassium  3.2 L  


 


Chloride  104  


 


Carbon Dioxide  25  


 


Anion Gap  17  


 


BUN  21 H  


 


Creatinine  0.7  


 


Est GFR ( Amer)  > 60  


 


Est GFR (Non-Af Amer)  > 60  


 


POC Glucose (mg/dL)    118 H


 


Random Glucose  154 H D  


 


Hemoglobin A1c   5.8 


 


Calcium  9.8  


 


Total Bilirubin  1.2  


 


AST  34  


 


ALT  19  


 


Alkaline Phosphatase  146 H  


 


Troponin I  < 0.0120  


 


Total Protein  8.1  


 


Albumin  4.8  


 


Globulin  3.2  


 


Albumin/Globulin Ratio  1.5  


 


Triglycerides  104  


 


Cholesterol  144  


 


LDL Cholesterol Direct  68  


 


HDL Cholesterol  70  














  02/23/19 02/23/19





  06:24 11:20


 


WBC  


 


RBC  


 


Hgb  


 


Hct  


 


MCV  


 


MCH  


 


MCHC  


 


RDW  


 


Plt Count  


 


MPV  


 


Neut % (Auto)  


 


Lymph % (Auto)  


 


Mono % (Auto)  


 


Eos % (Auto)  


 


Baso % (Auto)  


 


Neut # (Auto)  


 


Lymph # (Auto)  


 


Mono # (Auto)  


 


Eos # (Auto)  


 


Baso # (Auto)  


 


PT  


 


INR  


 


APTT  


 


Sodium   144


 


Potassium   3.0 L


 


Chloride   110 H


 


Carbon Dioxide   28


 


Anion Gap   9 L


 


BUN   13


 


Creatinine   0.6 L


 


Est GFR ( Amer)   > 60


 


Est GFR (Non-Af Amer)   > 60


 


POC Glucose (mg/dL)  88 


 


Random Glucose   92  D


 


Hemoglobin A1c  


 


Calcium   9.3


 


Total Bilirubin  


 


AST  


 


ALT  


 


Alkaline Phosphatase  


 


Troponin I  


 


Total Protein  


 


Albumin  


 


Globulin  


 


Albumin/Globulin Ratio  


 


Triglycerides  


 


Cholesterol  


 


LDL Cholesterol Direct  


 


HDL Cholesterol

## 2019-02-24 LAB
BUN SERPL-MCNC: 14 MG/DL (ref 7–17)
CALCIUM SERPL-MCNC: 9.4 MG/DL (ref 8.6–10.4)
GFR NON-AFRICAN AMERICAN: > 60

## 2019-02-24 RX ADMIN — INSULIN ASPART SCH: 100 INJECTION, SOLUTION INTRAVENOUS; SUBCUTANEOUS at 16:54

## 2019-02-24 RX ADMIN — INSULIN ASPART SCH: 100 INJECTION, SOLUTION INTRAVENOUS; SUBCUTANEOUS at 07:59

## 2019-02-24 RX ADMIN — INSULIN ASPART SCH: 100 INJECTION, SOLUTION INTRAVENOUS; SUBCUTANEOUS at 11:45

## 2019-02-24 RX ADMIN — INSULIN ASPART SCH: 100 INJECTION, SOLUTION INTRAVENOUS; SUBCUTANEOUS at 22:52

## 2019-02-24 NOTE — CP.PCM.PN
Subjective





- Date & Time of Evaluation


Date of Evaluation: 02/24/19


Time of Evaluation: 19:53





- Subjective


Subjective: 





Patient is seen and examined


Awake and alert


Daughter at bedside





Objective





- Vital Signs/Intake and Output


Vital Signs (last 24 hours): 


                                        











Temp Pulse Resp BP Pulse Ox


 


 97.9 F   64   20   155/68 H  98 


 


 02/24/19 15:34  02/24/19 15:34  02/24/19 15:34  02/24/19 15:34  02/24/19 15:34











- Medications


Medications: 


                               Current Medications





Amlodipine Besylate (Norvasc)  10 mg PO DAILY [facility]


   Last Admin: 02/24/19 09:07 Dose:  10 mg


Aspirin (Ecotrin)  81 mg PO DAILY [facility]


   Last Admin: 02/24/19 09:07 Dose:  81 mg


Clopidogrel Bisulfate (Plavix)  75 mg PO DAILY [facility]


   Last Admin: 02/24/19 09:07 Dose:  75 mg


Hydrochlorothiazide (Hydrodiuril)  25 mg PO DAILY [facility]


   Last Admin: 02/24/19 09:07 Dose:  25 mg


Sodium Chloride (Sodium Chloride 0.9%)  1,000 mls @ 100 mls/hr IV .Q10H [facility]


   Last Admin: 02/24/19 14:34 Dose:  100 mls/hr


[facility]  [facility]; Protocol


   Last Admin: 02/24/19 16:54 Dose:  Not Given


Losartan Potassium (Cozaar)  100 mg PO DAILY [facility]


   Last Admin: 02/24/19 09:07 Dose:  100 mg


Pneumococcal Polyvalent Vaccine (Pneumovax 23 Vaccine)  0.5 ml IM .ONCE ONE


   Stop: 02/25/19 10:01


Rosuvastatin Calcium (Crestor)  5 mg PO HS [facility]


   Last Admin: 02/23/19 21:56 Dose:  5 mg











- Labs


Labs: 


                                        





                                 02/22/19 18:04 





                                 02/24/19 08:06 





                                        











PT  10.9 SECONDS (9.7-12.2)   02/22/19  18:04    


 


INR  1.0   02/22/19  18:04    


 


APTT  30 SECONDS (21-34)   02/22/19  18:04    














- Head Exam


Head Exam: NORMAL INSPECTION





- Eye Exam


Eye Exam: Normal appearance





- ENT Exam


ENT Exam: Mucous Membranes Moist





- Respiratory Exam


Respiratory Exam: Clear to Ausculation Bilateral





- Cardiovascular Exam


Cardiovascular Exam: REGULAR RHYTHM





- GI/Abdominal Exam


GI & Abdominal Exam: Soft, Normal Bowel Sounds





- Extremities Exam


Extremities Exam: Normal Inspection





- Neurological Exam


Neurological Exam: Alert





Assessment and Plan


(1) CVA (cerebral vascular accident)


Status: Acute   





(2) Diabetes


Status: Acute   





(3) HTN (hypertension)


Status: Acute   





- Assessment and Plan (Free Text)


Plan: 





Continue Norvasc


Ecotrin 81 mg


Plavix 75 mg daily


Hydrochlorothiazide


Accu-Chek


Insulin coverage


Losartan


Neurology consult appreciated


PT/OT


DVT/GI prophylaxis

## 2019-02-24 NOTE — CP.PCM.PN
Subjective





- Date & Time of Evaluation


Date of Evaluation: 02/24/19


Time of Evaluation: 16:45





- Subjective


Subjective: 





  Patients MRI Brain is normal and there is no sign of stroke. She continues to 

have right sided facial weakness but i believe these symptoms are chronic. 





ROS: no headache, no nausea, no vomiting, no diarrhea, no malaise. 





On exam: 








Objective





- Vital Signs/Intake and Output


Vital Signs (last 24 hours): 


                                        











Temp Pulse Resp BP Pulse Ox


 


 97.9 F   67   20   147/57 L  98 


 


 02/24/19 09:16  02/24/19 09:16  02/24/19 09:16  02/24/19 09:16  02/24/19 09:16








Intake and Output: 


                                        











 02/24/19 02/24/19





 06:59 18:59


 


Intake Total 900 


 


Balance 900 














- Medications


Medications: 


                               Current Medications





Amlodipine Besylate (Norvasc)  10 mg PO DAILY [facility]


   Last Admin: 02/24/19 09:07 Dose:  10 mg


Aspirin (Ecotrin)  81 mg PO DAILY [facility]


   Last Admin: 02/24/19 09:07 Dose:  81 mg


Clopidogrel Bisulfate (Plavix)  75 mg PO DAILY [facility]


   Last Admin: 02/24/19 09:07 Dose:  75 mg


Hydrochlorothiazide (Hydrodiuril)  25 mg PO DAILY [facility]


   Last Admin: 02/24/19 09:07 Dose:  25 mg


Sodium Chloride (Sodium Chloride 0.9%)  1,000 mls @ 100 mls/hr IV .Q10H [facility]


   Last Admin: 02/24/19 14:34 Dose:  100 mls/hr


Ins[facility] ([facility]  [facility]; Protocol


   Last Admin: 02/24/19 11:45 Dose:  Not Given


Losartan Potassium (Cozaar)  100 mg PO DAILY [facility]


   Last Admin: 02/24/19 09:07 Dose:  100 mg


Pneumococcal Polyvalent Vaccine (Pneumovax 23 Vaccine)  0.5 ml IM .ONCE ONE


   Stop: 02/25/19 10:01


Rosuvastatin Calcium (Crestor)  5 mg PO [facility]


   Last Admin: 02/23/19 21:56 Dose:  5 mg











- Labs


Labs: 


                                        





                                 02/22/19 18:04 





                                 02/24/19 08:06 





                                        











PT  10.9 SECONDS (9.7-12.2)   02/22/19  18:04    


 


INR  1.0   02/22/19  18:04    


 


APTT  30 SECONDS (21-34)   02/22/19  18:04    














- Neurological Exam


Neurological Exam: Abnormal Gait, Alert, Altered, Awake, CN II-XII Intact, Motor

Sensory Deficit


Neuro motor strength exam: Left Upper Extremity: 5, Right Upper Extremity: 4, 

Left Lower Extremity: 5, Right Lower Extremity: 4


Additional comments: 





RIght sided weakness, with right drift, and decreased sensation in right arm and

leg. 


gait is mildly hemiplegic


Patient names and repeats well and has a right sided facial droop. 








Assessment and Plan





- Assessment and Plan (Free Text)


Assessment: 





  87 yr old woman who does not appear to have a new stroke, and has right sided 

weakness from old left mca stroke. MRI is normal. 


No further intervention at this point. 


Thank you


Dr. Fontenot

## 2019-02-25 RX ADMIN — INSULIN ASPART SCH: 100 INJECTION, SOLUTION INTRAVENOUS; SUBCUTANEOUS at 12:20

## 2019-02-25 RX ADMIN — INSULIN ASPART SCH: 100 INJECTION, SOLUTION INTRAVENOUS; SUBCUTANEOUS at 08:15

## 2019-02-25 RX ADMIN — INSULIN ASPART SCH: 100 INJECTION, SOLUTION INTRAVENOUS; SUBCUTANEOUS at 16:30

## 2019-02-25 RX ADMIN — INSULIN ASPART SCH: 100 INJECTION, SOLUTION INTRAVENOUS; SUBCUTANEOUS at 23:27

## 2019-02-25 NOTE — CP.PCM.PN
Subjective





- Date & Time of Evaluation


Date of Evaluation: 02/25/19


Time of Evaluation: 14:08





- Subjective


Subjective: 





Patient is seen and examined


No events overnight





Objective





- Vital Signs/Intake and Output


Vital Signs (last 24 hours): 


                                        











Temp Pulse Resp BP Pulse Ox


 


 98.1 F   59 L  20   144/58 L  97 


 


 02/25/19 07:05  02/25/19 07:05  02/25/19 07:05  02/25/19 07:05  02/25/19 07:05











- Medications


Medications: 


                               Current Medications





Amlodipine Besylate (Norvasc)  10 mg PO DAILY [facility]


   Last Admin: 02/25/19 09:18 Dose:  10 mg


Aspirin (Ecotrin)  81 mg PO DAILY [facility]


   Last Admin: 02/25/19 09:17 Dose:  81 mg


Clopidogrel Bisulfate (Plavix)  75 mg PO DAILY [facility]


   Last Admin: 02/25/19 09:17 Dose:  75 mg


Hydrochlorothiazide (Hydrodiuril)  25 mg PO DAILY [facility]


   Last Admin: 02/25/19 09:17 Dose:  25 mg


Sodium Chloride (Sodium Chloride 0.9%)  1,000 mls @ 100 mls/hr IV .Q10H [facility]


   Last Admin: 02/24/19 20:00 Dose:  Not Given


Insulin Aspart (Novolog)  0 unit SC [facility]; Protocol


   Last Admin: 02/25/19 12:20 Dose:  Not Given


Losartan Potassium (Cozaar)  100 mg PO DAILY [facility]


   Last Admin: 02/25/19 09:17 Dose:  100 mg


Rosuvastatin Calcium (Crestor)  5 mg PO HS [facility]


   Last Admin: 02/24/19 21:57 Dose:  5 mg











- Labs


Labs: 


                                        





                                 02/22/19 18:04 





                                 02/24/19 08:06 





                                        











PT  10.9 SECONDS (9.7-12.2)   02/22/19  18:04    


 


INR  1.0   02/22/19  18:04    


 


APTT  30 SECONDS (21-34)   02/22/19  18:04    














- Head Exam


Head Exam: NORMAL INSPECTION





- Eye Exam


Eye Exam: Normal appearance





- ENT Exam


ENT Exam: Mucous Membranes Moist





- Respiratory Exam


Respiratory Exam: Clear to Ausculation Bilateral





- Cardiovascular Exam


Cardiovascular Exam: REGULAR RHYTHM, +S1, +S2





- GI/Abdominal Exam


GI & Abdominal Exam: Soft, Normal Bowel Sounds





- Extremities Exam


Extremities Exam: Normal Inspection





Assessment and Plan


(1) CVA (cerebral vascular accident)


Status: Acute   





(2) Diabetes


Status: Acute   





(3) HTN (hypertension)


Status: Acute   





- Assessment and Plan (Free Text)


Plan: 





Norvasc


Ecotrin 81 mg


Plavix 75 mg daily


Hydrochlorothiazide


Accu-Chek


Insulin coverage


Losartan


PT/OT


DVT/GI prophylaxis

## 2019-02-25 NOTE — CON
DATE:  02/23/2019



Neurology consult called by Dr. Lion in emergency room.



HISTORY OF PRESENT ILLNESS:  Joanne Saul is an 87-year-old woman with

past medical history of ischemic stroke, hypertension, and Alzheimer's

disease who lives with her children and with guardian for a sudden onset of

confusion and shaking.  There is no urinary incontinence or tongue biting. 

The patient's daughter states that she was at home, doing well, and all of

a sudden the patient started shaking, felt warm, and became unresponsive

for approximately 10 seconds.  She returned to baseline and she was brought

in to the emergency room where Dr. Lion called code stroke.  She is not

thought to be a tPA candidate because her symptoms resolving with a stroke

scale of 0; however, CAT scan showed initially read as subacute left

frontotemporal infarct, now read as a chronic left temporoparietal infarct.

I agree with this second reading because the patient has a chronic infarct;

however, the presence of a new infarct cannot be ruled out.  On exam, the

patient today is awake and alert and participating in the neurological

exam.  She appears to have chronic word finding difficulties and aphasia.



REVIEW OF SYSTEMS:  There is no headache.  There is no nausea, vomiting, or

dizziness.  There is some discomfort.  There is no weakness.  There are no

other complaints.



PAST MEDICAL HISTORY:  As above.



PAST SURGICAL HISTORY:  None.



FAMILY HISTORY AND SOCIAL HISTORY:  She lives with her family.  There is no

tobacco.  No alcohol.



ALLERGIES:  NO KNOWN DRUG ALLERGIES.



PHYSICAL EXAMINATION:

NEUROLOGIC:  The patient is alert, awake, and oriented x2.  She has a

right-sided facial droop.  Speech is fluent.  She cannot name, she cannot

repeat, she follows few step commands.  Motor tone is normal.  Right arm is

4/5 and there is a drift.  Right  is 4+/5.  Left upper and lower

extremity is 5/5 upper and lower limb bilaterally.  Sensory; there is

intact fine touch, pin, position bilaterally upper and lower limb. 

Reflexes +2 upper and lower limb.  Gait, the patient is refused that she is

becoming dizzy.  There is no clonus.  There is no dysmetria on

finger-to-nose.



LABORATORY DATA:  As follows; white count 9.9, hemoglobin 10.4, hematocrit

39.8, and platelets 278.  PT, PTT, and INR is 30 and normal. 

Chemistry is normal except for potassium 3, chloride 110, anion gap 9,

creatinine is 0.6, and alk phos 136.  HDL, LDL, cholesterol, and

triglycerides all are within normal limits.



IMPRESSION:  This is an 87-year-old woman with possible new ischemic

stroke, could be affecting the left middle cerebral artery territory, it

could be in the same territory that it was last year.  It could also be

recrudescence of her old stroke because of metabolic issues.



PLAN:

1.  MRI of the brain as soon as possible.

2.  Continue aspirin and Plavix.

3.  Please keep blood pressure elevated to 180/90.

4.  IV fluids normal saline 100 mL.

5.  PT/ST/OT.

6.  DVT prophylaxis, our team will follow.



Thank you for this consult.







__________________________________________

Marija Fontenot MD





DD:  02/23/2019 13:02:29

DT:  02/23/2019 13:35:54

Job # [id number]

[email or web address]

## 2019-02-26 VITALS
DIASTOLIC BLOOD PRESSURE: 65 MMHG | SYSTOLIC BLOOD PRESSURE: 127 MMHG | RESPIRATION RATE: 18 BRPM | TEMPERATURE: 98 F | OXYGEN SATURATION: 98 % | HEART RATE: 67 BPM

## 2019-02-26 RX ADMIN — INSULIN ASPART SCH: 100 INJECTION, SOLUTION INTRAVENOUS; SUBCUTANEOUS at 13:25

## 2019-02-26 RX ADMIN — INSULIN ASPART SCH: 100 INJECTION, SOLUTION INTRAVENOUS; SUBCUTANEOUS at 16:32

## 2019-02-26 RX ADMIN — INSULIN ASPART SCH: 100 INJECTION, SOLUTION INTRAVENOUS; SUBCUTANEOUS at 08:34

## 2019-02-26 NOTE — CP.PCM.PN
Subjective





- Date & Time of Evaluation


Date of Evaluation: 02/26/19


Time of Evaluation: 15:24





- Subjective


Subjective: 





Patient is seen and examined


No events overnight





Objective





- Vital Signs/Intake and Output


Vital Signs (last 24 hours): 


                                        











Temp Pulse Resp BP Pulse Ox


 


 98.4 F   57 L  20   143/64   99 


 


 02/26/19 07:00  02/26/19 07:00  02/26/19 07:00  02/26/19 07:00  02/26/19 07:00











- Medications


Medications: 


                               Current Medications





Amlodipine Besylate (Norvasc)  [facility]


   Last Admin: 02/26/19 09:58 Dose:  10 mg


Aspirin (Ecotrin)  [facility] mg [facility]


   Last Admin: 02/26/19 09:58 Dose:  81 mg


Clopidogrel Bisulfate (Plavix)  [facility]


   Last Admin: 02/26/19 09:58 Dose:  75 mg


Hydrochlorothiazide (Hydrodiuril)  25 mg PO [facility]ILY [facility]


   Last Admin: 02/26/19 09:58 Dose:  25 mg


Insulin Aspart (Novolog)  0 unit SC [facility]; Protocol


   Last Admin: 02/26/19 13:25 Dose:  Not Given


Losartan Potassium (Cozaar)  100 mg PO [facility]ILY [facility]


   Last Admin: 02/26/19 09:58 Dose:  100 mg


Rosuvastatin Calcium (Crestor)  5 mg PO HS [facility]


   Last Admin: 02/25/19 23:27 Dose:  Not Given











- Labs


Labs: 


                                        





                                 02/22/19 18:04 





                                 02/24/19 08:06 





                                        











PT  10.9 SECONDS (9.7-12.2)   02/22/19  18:04    


 


INR  1.0   02/22/19  18:04    


 


APTT  30 SECONDS (21-34)   02/22/19  18:04    














- Head Exam


Head Exam: NORMAL INSPECTION





- Eye Exam


Eye Exam: Normal appearance





- ENT Exam


ENT Exam: Mucous Membranes Moist





- Respiratory Exam


Respiratory Exam: Clear to Ausculation Bilateral





- Cardiovascular Exam


Cardiovascular Exam: REGULAR RHYTHM, +S1, +S2





- GI/Abdominal Exam


GI & Abdominal Exam: Soft, Normal Bowel Sounds





- Extremities Exam


Extremities Exam: Normal Inspection





Assessment and Plan


(1) CVA (cerebral vascular accident)


Status: Acute   





(2) Diabetes


Status: Acute   





(3) HTN (hypertension)


Status: Acute   





- Assessment and Plan (Free Text)


Plan: 





Norvasc


Ecotrin 81 mg


Plavix 75 mg daily


Hydrochlorothiazide


Accu-Chek


Insulin coverage


Losartan


PT/OT


Discharge patient if cleared by neurology


DVT/GI prophylaxis

## 2019-02-26 NOTE — CP.PCM.PN
Objective





- Vital Signs/Intake and Output


Vital Signs (last 24 hours): 


                                        











Temp Pulse Resp BP Pulse Ox


 


 98 F   67   18   127/65   98 


 


 02/26/19 15:36  02/26/19 15:36  02/26/19 15:36  02/26/19 15:36  02/26/19 15:36











- Medications


Medications: 


                               Current Medications





Amlodipine Besylate (Norvasc)  10 mg PO DAILY [facility]


   Last Admin: 02/26/19 09:58 Dose:  10 mg


Aspirin (Ecotrin)  81 mg PO DAILY [facility]


   Last Admin: 02/26/19 09:58 Dose:  81 mg


Clopidogrel Bisulfate (Plavix)  75 mg PO DAILY [facility]


   Last Admin: 02/26/19 09:58 Dose:  75 mg


Hydrochlorothiazide (Hydrodiuril)  25 mg PO DAILY [facility]


   Last Admin: 02/26/19 09:58 Dose:  25 mg


Insulin Aspart (Novolog)  0 unit SC [facility]; Protocol


   Last Admin: 02/26/19 16:32 Dose:  Not Given


Losartan Potassium (Cozaar)  100 mg PO DAILY [facility]


   Last Admin: 02/26/19 09:58 Dose:  100 mg


Rosuvastatin Calcium (Crestor)  5 mg PO HS [facility]


   Last Admin: 02/25/19 23:27 Dose:  Not Given











- Labs


Labs: 


                                        





                                 02/22/19 18:04 





                                 02/24/19 08:06 





                                        











PT  10.9 SECONDS (9.7-12.2)   02/22/19  18:04    


 


INR  1.0   02/22/19  18:04    


 


APTT  30 SECONDS (21-34)   02/22/19  18:04    














Assessment and Plan





- Assessment and Plan (Free Text)


Assessment: 





87 year old female admitted with subacute stroke, seen and examined. Alert and 

responsive, out of bed on the chair, denies any pain or distress.

## 2019-02-27 NOTE — CP.PCM.DIS
Provider





- Provider


Date of Admission: 


02/22/19 19:05





Attending physician: 


Marcelino Flores MD





Consults: 








02/22/19 19:10


Neurology Consult Stat 


   Comment: change of MS


   Consulting Provider: Marija Fontenot


   Consulting Physician: Marija Fontenot


   Reason for Consult: change of MS





02/22/19 22:35


Nursing Referral for Wound Care Routine 


   Comment: 


   Physician Instructions: 


   Reason For Exam: [place] screen











Time Spent in preparation of Discharge (in minutes): 35





Diagnosis





- Discharge Diagnosis


(1) CVA (cerebral vascular accident)


Status: Acute   





(2) Diabetes


Status: Acute   





(3) HTN (hypertension)


Status: Acute   





Hospital Course





- Lab Results


Lab Results: 


                             Most Recent Lab Values











WBC  8.9 K/uL (4.8-10.8)   02/22/19  18:04    


 


RBC  4.70 Mil/uL (3.80-5.20)   02/22/19  18:04    


 


Hgb  13.4 g/dL (11.0-16.0)   02/22/19  18:04    


 


Hct  39.8 % (34.0-47.0)   02/22/19  18:04    


 


MCV  84.7 fL (81.0-99.0)   02/22/19  18:04    


 


MCH  28.6 pg (27.0-31.0)   02/22/19  18:04    


 


MCHC  33.7 g/dL (33.0-37.0)   02/22/19  18:04    


 


RDW  14.9 % (11.5-14.5)  H  02/22/19  18:04    


 


Plt Count  278 K/uL (130-400)   02/22/19  18:04    


 


MPV  10.3 fL (7.2-11.7)   02/22/19  18:04    


 


Neut % (Auto)  76.1 % (50.0-75.0)  H  02/22/19  18:04    


 


Lymph % (Auto)  17.3 % (20.0-40.0)  L  02/22/19  18:04    


 


Mono % (Auto)  5.4 % (0.0-10.0)   02/22/19  18:04    


 


Eos % (Auto)  0.4 % (0.0-4.0)   02/22/19  18:04    


 


Baso % (Auto)  0.8 % (0.0-2.0)   02/22/19  18:04    


 


Neut # (Auto)  6.8 K/uL (1.8-7.0)   02/22/19  18:04    


 


Lymph # (Auto)  1.6 K/uL (1.0-4.3)   02/22/19  18:04    


 


Mono # (Auto)  0.5 K/uL (0.0-0.8)   02/22/19  18:04    


 


Eos # (Auto)  0.0 K/uL (0.0-0.7)   02/22/19  18:04    


 


Baso # (Auto)  0.1 K/uL (0.0-0.2)   02/22/19  18:04    


 


PT  10.9 SECONDS (9.7-12.2)   02/22/19  18:04    


 


INR  1.0   02/22/19  18:04    


 


APTT  30 SECONDS (21-34)   02/22/19  18:04    


 


Sodium  141 mmol/L (132-148)   02/24/19  08:06    


 


Potassium  3.5 mmol/L (3.6-5.2)  L  02/24/19  08:06    


 


Chloride  108 mmol/L ()  H  02/24/19  08:06    


 


Carbon Dioxide  26 mmol/L (22-30)   02/24/19  08:06    


 


Anion Gap  10  (10-20)   02/24/19  08:06    


 


BUN  14 mg/dL (7-17)   02/24/19  08:06    


 


Creatinine  0.7 mg/dL (0.7-1.2)   02/24/19  08:06    


 


Est GFR ( Amer)  > 60   02/24/19  08:06    


 


Est GFR (Non-Af Amer)  > 60   02/24/19  08:06    


 


POC Glucose (mg/dL)  157 mg/dL ()  H  02/26/19  11:27    


 


Random Glucose  99 mg/dL ()   02/24/19  08:06    


 


Hemoglobin A1c  5.8 % (4.2-6.5)   02/22/19  18:04    


 


Calcium  9.4 mg/dl (8.6-10.4)   02/24/19  08:06    


 


Total Bilirubin  1.2 mg/dL (0.2-1.3)   02/22/19  18:04    


 


AST  34 U/L (14-36)   02/22/19  18:04    


 


ALT  19 U/L (9-52)   02/22/19  18:04    


 


Alkaline Phosphatase  146 U/L ()  H  02/22/19  18:04    


 


Troponin I  < 0.0120 ng/mL (0.00-0.120)   02/22/19  18:04    


 


Total Protein  8.1 g/dL (6.3-8.3)   02/22/19  18:04    


 


Albumin  4.8 g/dL (3.5-5.0)   02/22/19  18:04    


 


Globulin  3.2 gm/dL (2.2-3.9)   02/22/19  18:04    


 


Albumin/Globulin Ratio  1.5  (1.0-2.1)   02/22/19  18:04    


 


Triglycerides  104 mg/dL (0-149)   02/22/19  18:04    


 


Cholesterol  144 mg/dL (0-199)   02/22/19  18:04    


 


LDL Cholesterol Direct  68 mg/dL (0-129)   02/22/19  18:04    


 


HDL Cholesterol  70 mg/dL (30-70)   02/22/19  18:04    














- Hospital Course


Hospital Course: 





Patient presented with altered mental status due to subacute stroke.  Patient's 

altered mental status resolved and her condition improved.  Patient did not have

any significant residual weakness and is being discharged home in stable 

condition





Discharge Exam





- Head Exam


Head Exam: NORMAL INSPECTION





Discharge Plan





- Follow Up Plan


Condition: FAIR


Disposition: REHAB FACILITY/REHAB UNIT


Instructions:  Stroke (DC), Altered Mental Status (DC), Aphasia (DC)


Additional Instructions: 


DISCHARGE TO [facility] AS PER DR FLORES.


PT/OT AS TOLERATED





Referrals: 


Marcelino Flores MD [Staff Provider] - 


Cruz Germain MD [Staff Provider] - 


Marija Fontenot MD [Staff Provider] -

## 2021-07-12 NOTE — CT
Date of service: 



02/22/2019



PROCEDURE:  CT HEAD WITHOUT CONTRAST.



HISTORY:

Mental status change 



COMPARISON:

No prior study available comparison.



TECHNIQUE:

Axial computed tomography images were obtained through the head/brain 

without intravenous contrast.  



Radiation dose:



Total exam DLP = 0.0 mGy-cm.



This CT exam was performed using one or more of the following dose 

reduction techniques: Automated exposure control, adjustment of the 

mA and/or kV according to patient size, and/or use of iterative 

reconstruction technique.



FINDINGS:



HEMORRHAGE:

No acute parenchymal, subarachnoid or extra-axial hemorrhage. 



BRAIN:

There are discrete chronic appearing left frontoparietal infarct 

changes with overlying ex vacuo enlargement of the cerebral sulci. 

There is also on ex vacuo dilatation of the left lateral ventricle... 

In addition, moderate diffuse and confluent chronic white matter 

ischemic changes seen extending peripherally into the deep and 

subcortical white matter both cerebral hemispheres. Few scattered 

chronic bilateral basal nuclei lacunar type infarcts also felt 

present.. Note that the possibility of a small hyperacute infarct not 

excluded. 



Moderate to significant atrophy slightly more central evidenced by 

disproportionate enlargement of the ventricles compared to sulci. 



Vascular calcifications both carotid siphons. 



No obvious parenchymal nor extra-axial mass or collection seen on 

this noncontrast exam 



VENTRICLES:

The ventricles are dilated felt to be on ex vacuo basis particularly 

the left lateral ventricle more so than right and do not appear to be 

under tension 



CALVARIUM:

Unremarkable.



PARANASAL SINUSES:

Unremarkable as visualized. No significant inflammatory changes.



MASTOID AIR CELLS:

Note that in situ hearing aid partially obscures the temporal bones 

including the mastoid air complexes. There is partial opacification 

of the right mastoid air complex and subtotal opacification left 

mastoid air complex. There is also partial opacification left middle 

ear canal.. 



OTHER FINDINGS:

The cataract surgery present. 



IMPRESSION:

There are discrete chronic appearing left frontoparietal infarct 

changes with overlying ex vacuo enlargement of the cerebral sulci. 

There is also on ex vacuo dilatation of the left lateral ventricle... 

In addition, moderate diffuse and confluent chronic white matter 

ischemic changes seen extending peripherally into the deep and 

subcortical white matter both cerebral hemispheres. Few scattered 

chronic bilateral basal nuclei lacunar type infarcts also felt 

present.. Note that the possibility of a small hyperacute infarct not 

excluded. 



Moderate to significant atrophy slightly more central evidenced by 

disproportionate enlargement of the ventricles compared to sulci. Patient information on fall and injury prevention